# Patient Record
Sex: MALE | Race: WHITE
[De-identification: names, ages, dates, MRNs, and addresses within clinical notes are randomized per-mention and may not be internally consistent; named-entity substitution may affect disease eponyms.]

---

## 2019-10-27 ENCOUNTER — HOSPITAL ENCOUNTER (OUTPATIENT)
Dept: HOSPITAL 47 - RADMRIMAIN | Age: 24
Discharge: HOME | End: 2019-10-27
Attending: ORTHOPAEDIC SURGERY
Payer: COMMERCIAL

## 2019-10-27 DIAGNOSIS — M25.461: ICD-10-CM

## 2019-10-27 DIAGNOSIS — S83.411A: ICD-10-CM

## 2019-10-27 DIAGNOSIS — S83.511A: Primary | ICD-10-CM

## 2019-10-27 DIAGNOSIS — S80.11XA: ICD-10-CM

## 2019-10-27 NOTE — MR
EXAMINATION TYPE: MR knee RT wo con

 

DATE OF EXAM: 10/27/2019

 

COMPARISON:

None

HISTORY: Right knee pain, injury

 

TECHNIQUE: Multiplanar, multisequence imaging of the right knee is performed without IV contrast.

 

FINDINGS:

 

There is wavy appearance of the anterior cruciate ligament consistent with essentially complete tear.
 The posterior cruciate ligament is intact. There is large knee joint effusion. There is increased si
gnal on the proton density images in the lateral tibial condyle consistent with significant bone brui
se.

 

There is thickening and increased signal in the medial collateral ligament. The lateral collateral li
gament appears intact.

 

The lateral meniscus appears intact. The medial meniscus is intact. I see no fracture line. The ordaz
la is intact. Joint spaces are fairly normal.

 

IMPRESSION:

Large knee joint effusion. Complete tear anterior cruciate ligament.

 

Essentially complete tear of the medial collateral ligament.

 

No evidence of any significant meniscal tear.

 

Large area of bone bruise involving the posterior lateral tibial condyle. No fracture seen. Small bon
e bruise of 1 cm noted on the medial aspect medial tibial condyle.

 

IMPRESSION: